# Patient Record
Sex: MALE | Race: WHITE | Employment: FULL TIME | URBAN - NONMETROPOLITAN AREA
[De-identification: names, ages, dates, MRNs, and addresses within clinical notes are randomized per-mention and may not be internally consistent; named-entity substitution may affect disease eponyms.]

---

## 2019-10-22 ENCOUNTER — OFFICE VISIT (OUTPATIENT)
Dept: FAMILY MEDICINE CLINIC | Age: 29
End: 2019-10-22
Payer: COMMERCIAL

## 2019-10-22 VITALS
TEMPERATURE: 97.4 F | SYSTOLIC BLOOD PRESSURE: 132 MMHG | BODY MASS INDEX: 44.1 KG/M2 | DIASTOLIC BLOOD PRESSURE: 88 MMHG | HEIGHT: 71 IN | WEIGHT: 315 LBS | HEART RATE: 97 BPM | RESPIRATION RATE: 16 BRPM | OXYGEN SATURATION: 97 %

## 2019-10-22 DIAGNOSIS — J06.9 UPPER RESPIRATORY INFECTION WITH COUGH AND CONGESTION: Primary | ICD-10-CM

## 2019-10-22 PROCEDURE — 99213 OFFICE O/P EST LOW 20 MIN: CPT | Performed by: NURSE PRACTITIONER

## 2021-09-09 ENCOUNTER — OFFICE VISIT (OUTPATIENT)
Dept: FAMILY MEDICINE CLINIC | Age: 31
End: 2021-09-09
Payer: COMMERCIAL

## 2021-09-09 VITALS
TEMPERATURE: 97.3 F | SYSTOLIC BLOOD PRESSURE: 120 MMHG | HEART RATE: 106 BPM | BODY MASS INDEX: 44.1 KG/M2 | WEIGHT: 315 LBS | HEIGHT: 71 IN | OXYGEN SATURATION: 95 % | DIASTOLIC BLOOD PRESSURE: 74 MMHG

## 2021-09-09 DIAGNOSIS — R19.7 DIARRHEA, UNSPECIFIED TYPE: ICD-10-CM

## 2021-09-09 DIAGNOSIS — R11.2 NAUSEA AND VOMITING, INTRACTABILITY OF VOMITING NOT SPECIFIED, UNSPECIFIED VOMITING TYPE: ICD-10-CM

## 2021-09-09 DIAGNOSIS — A08.4 VIRAL GASTROENTERITIS: Primary | ICD-10-CM

## 2021-09-09 PROCEDURE — G8427 DOCREV CUR MEDS BY ELIG CLIN: HCPCS | Performed by: INTERNAL MEDICINE

## 2021-09-09 PROCEDURE — G8417 CALC BMI ABV UP PARAM F/U: HCPCS | Performed by: INTERNAL MEDICINE

## 2021-09-09 PROCEDURE — 99213 OFFICE O/P EST LOW 20 MIN: CPT | Performed by: INTERNAL MEDICINE

## 2021-09-09 PROCEDURE — 1036F TOBACCO NON-USER: CPT | Performed by: INTERNAL MEDICINE

## 2021-09-09 ASSESSMENT — ENCOUNTER SYMPTOMS
ABDOMINAL PAIN: 0
DIARRHEA: 1
SHORTNESS OF BREATH: 0
BLOOD IN STOOL: 0
VOMITING: 1
NAUSEA: 1

## 2021-09-09 NOTE — PROGRESS NOTES
are normal.      Palpations: Abdomen is soft. Tenderness: There is no abdominal tenderness. Musculoskeletal:      Cervical back: Neck supple. No tenderness. Lymphadenopathy:      Cervical: No cervical adenopathy. Skin:     General: Skin is warm and dry. Neurological:      Mental Status: He is alert and oriented to person, place, and time. Psychiatric:         Mood and Affect: Mood normal.         Behavior: Behavior normal.         Thought Content: Thought content normal.         Judgment: Judgment normal.               Assessment and Plan:  Yuan was seen today for nausea & vomiting and headache. Diagnoses and all orders for this visit:    Viral gastroenteritis    Nausea and vomiting, intractability of vomiting not specified, unspecified vomiting type    Diarrhea, unspecified type      Plan: I told her this sounds like a viral gastrointestinal situation hopefully will resolve in the next couple days. I advised him to push fluids rest can trial Pepto-Bismol or Imodium. Watch for any fevers or blood in the stool. Follow-up with PCP. Notify us if not improving. Gave him off for the next couple days. Return for Follow-up PCP. Seen By:  Cecily Quarles MD      *Document was created using voice recognition software. Note was reviewed however may contain grammatical errors.

## 2021-12-10 ENCOUNTER — OFFICE VISIT (OUTPATIENT)
Dept: FAMILY MEDICINE CLINIC | Age: 31
End: 2021-12-10
Payer: COMMERCIAL

## 2021-12-10 VITALS — BODY MASS INDEX: 44.1 KG/M2 | HEIGHT: 71 IN | WEIGHT: 315 LBS

## 2021-12-10 DIAGNOSIS — M79.10 MYALGIA: ICD-10-CM

## 2021-12-10 DIAGNOSIS — R05.9 COUGH: ICD-10-CM

## 2021-12-10 DIAGNOSIS — U07.1 COVID: Primary | ICD-10-CM

## 2021-12-10 LAB
Lab: NORMAL
PERFORMING INSTRUMENT: NORMAL
QC PASS/FAIL: NORMAL
SARS-COV-2, POC: NORMAL

## 2021-12-10 PROCEDURE — G8427 DOCREV CUR MEDS BY ELIG CLIN: HCPCS | Performed by: INTERNAL MEDICINE

## 2021-12-10 PROCEDURE — G8484 FLU IMMUNIZE NO ADMIN: HCPCS | Performed by: INTERNAL MEDICINE

## 2021-12-10 PROCEDURE — 99213 OFFICE O/P EST LOW 20 MIN: CPT | Performed by: INTERNAL MEDICINE

## 2021-12-10 PROCEDURE — G8417 CALC BMI ABV UP PARAM F/U: HCPCS | Performed by: INTERNAL MEDICINE

## 2021-12-10 PROCEDURE — 1036F TOBACCO NON-USER: CPT | Performed by: INTERNAL MEDICINE

## 2021-12-10 PROCEDURE — 87426 SARSCOV CORONAVIRUS AG IA: CPT | Performed by: INTERNAL MEDICINE

## 2021-12-10 NOTE — LETTER
NOTIFICATION RETURN TO WORK / SCHOOL    12/10/2021    Mr. Melissa Solomon  [id number] [facility] [id number]      To Whom It May Concern:    Melissa Solomon was tested for COVID-19 on 12/10/2021, and the result was negative. He may return to work 12/13/2021. I recommend:return without restrictions    If there are questions or concerns, please have the patient contact our office.         Sincerely,      Arielle Simons MD

## 2021-12-11 ASSESSMENT — ENCOUNTER SYMPTOMS
WHEEZING: 0
SINUS PRESSURE: 0
SORE THROAT: 0
NAUSEA: 0
COUGH: 1
SINUS PAIN: 0
SHORTNESS OF BREATH: 0
EYES NEGATIVE: 1
CHEST TIGHTNESS: 0
ABDOMINAL PAIN: 0
DIARRHEA: 0
VOMITING: 0

## 2021-12-11 NOTE — PROGRESS NOTES
[address] [place]     21  Elly Owens : 1990 Sex: male  Age: 28 y.o. Chief Complaint   Patient presents with    Positive For Covid-19     tested positive for covid on ; still has cough/ congestion; joints are still sore, body aches; his dad tested positive the same time as him and just tested positive again       HPI  Patient presents to express care today wanting to get okay to go back to work. Still seems to have a multitude of complaints but all of improved. He tested positive for Covid on the  of last month which was about 3 weeks ago. Still has no taste does have an occasional cough and some myalgia but states she is definitely improved. He is not running any temperatures. Review of Systems   Constitutional: Negative for chills, fatigue and fever. HENT: Positive for congestion and postnasal drip. Negative for ear pain, sinus pressure, sinus pain and sore throat. Eyes: Negative. Respiratory: Positive for cough. Negative for chest tightness, shortness of breath and wheezing. Cardiovascular: Negative for chest pain. Gastrointestinal: Negative for abdominal pain, diarrhea, nausea and vomiting. Musculoskeletal: Positive for myalgias. Neurological: Negative for headaches. REST OF PERTINENT ROS GONE OVER AND WAS NEGATIVE. No current outpatient medications on file. No Known Allergies    No past medical history on file. No past surgical history on file. No family history on file.   Social History     Socioeconomic History    Marital status: Single     Spouse name: Not on file    Number of children: Not on file    Years of education: Not on file    Highest education level: Not on file   Occupational History    Not on file   Tobacco Use    Smoking status: Former Smoker     Packs/day: 1.00     Years: 10.00     Pack years: 10.00     Types: Cigarettes     Start date: 2009     Quit date: 2019     Years since quitting: 2. 2    Smokeless tobacco: Never Used   Vaping Use    Vaping Use: Never used   Substance and Sexual Activity    Alcohol use: Not on file    Drug use: Not on file    Sexual activity: Not on file   Other Topics Concern    Not on file   Social History Narrative    Not on file     Social Determinants of Health     Financial Resource Strain:     Difficulty of Paying Living Expenses: Not on file   Food Insecurity:     Worried About Running Out of Food in the Last Year: Not on file    Cyn of Food in the Last Year: Not on file   Transportation Needs:     Lack of Transportation (Medical): Not on file    Lack of Transportation (Non-Medical): Not on file   Physical Activity:     Days of Exercise per Week: Not on file    Minutes of Exercise per Session: Not on file   Stress:     Feeling of Stress : Not on file   Social Connections:     Frequency of Communication with Friends and Family: Not on file    Frequency of Social Gatherings with Friends and Family: Not on file    Attends Orthodox Services: Not on file    Active Member of [address] [place] [address] or Organizations: Not on file    Attends Club or Organization Meetings: Not on file    Marital Status: Not on file   Intimate Partner Violence:     Fear of Current or Ex-Partner: Not on file    Emotionally Abused: Not on file    Physically Abused: Not on file    Sexually Abused: Not on file   Housing Stability:     Unable to Pay for Housing in the Last Year: Not on file    Number of [place] in the Last Year: Not on file    Unstable Housing in the Last Year: Not on file       Vitals:    12/10/21 1641   Weight: (!) 430 lb (195 kg)   Height: 5' 11\" (1.803 m)       Physical Exam  Vitals and nursing note reviewed. Constitutional:       General: He is not in acute distress. Appearance: He is well-developed. He is obese. HENT:      Head: Normocephalic and atraumatic.       Right Ear: Tympanic membrane, ear canal and external ear normal.      Left Ear: Tympanic membrane, ear canal and external ear normal.      Nose: Nose normal.      Mouth/Throat:      Mouth: Mucous membranes are moist.      Pharynx: Oropharynx is clear. No oropharyngeal exudate. Comments: Clear mucus draining posterior pharynx  Cardiovascular:      Rate and Rhythm: Normal rate and regular rhythm. Heart sounds: Normal heart sounds. No murmur heard. Pulmonary:      Effort: Pulmonary effort is normal. No respiratory distress. Breath sounds: Normal breath sounds. No wheezing, rhonchi or rales. Musculoskeletal:         General: Normal range of motion. Cervical back: Normal range of motion and neck supple. No tenderness. Lymphadenopathy:      Cervical: No cervical adenopathy. Skin:     General: Skin is warm and dry. Neurological:      Mental Status: He is alert and oriented to person, place, and time. Psychiatric:         Mood and Affect: Mood normal.         Behavior: Behavior normal.         Thought Content: Thought content normal.         Judgment: Judgment normal.                 Assessment and Plan:  Yuan was seen today for positive for covid-19. Diagnoses and all orders for this visit:    COVID  -     POCT COVID-19, Antigen  Testing negative currently    Myalgia  -     POCT COVID-19, Antigen  Improved    Cough  -     POCT COVID-19, Antigen  Improved    Plan: I feel that he can go back to work testing negative for Covid today. Letter was written for patient. I told him he needs to establish with PCP. Notify us with problems in the interim. No follow-ups on file. Seen By:  Valentina Worley MD      *Document was created using voice recognition software. Note was reviewed however may contain grammatical errors.